# Patient Record
Sex: FEMALE | Race: WHITE | NOT HISPANIC OR LATINO | Employment: FULL TIME | URBAN - METROPOLITAN AREA
[De-identification: names, ages, dates, MRNs, and addresses within clinical notes are randomized per-mention and may not be internally consistent; named-entity substitution may affect disease eponyms.]

---

## 2017-08-25 ENCOUNTER — TRANSCRIBE ORDERS (OUTPATIENT)
Dept: PHYSICAL THERAPY | Facility: CLINIC | Age: 42
End: 2017-08-25

## 2017-08-25 DIAGNOSIS — M77.12 LATERAL EPICONDYLITIS OF LEFT ELBOW: Primary | ICD-10-CM

## 2017-08-30 ENCOUNTER — TREATMENT (OUTPATIENT)
Dept: PHYSICAL THERAPY | Facility: CLINIC | Age: 42
End: 2017-08-30

## 2017-08-30 DIAGNOSIS — M25.522 LEFT ELBOW PAIN: Primary | ICD-10-CM

## 2017-08-30 PROCEDURE — 97110 THERAPEUTIC EXERCISES: CPT | Performed by: PHYSICAL THERAPIST

## 2017-08-30 PROCEDURE — 97001 PR PHYS THERAPY EVALUATION: CPT | Performed by: PHYSICAL THERAPIST

## 2017-08-30 PROCEDURE — 97140 MANUAL THERAPY 1/> REGIONS: CPT | Performed by: PHYSICAL THERAPIST

## 2017-08-30 NOTE — PROGRESS NOTES
"Physical Therapy Initial Evaluation and Plan of Care        Subjective Evaluation    History of Present Illness  Mechanism of injury: Patient reports that she initially injuried elbow in  and had 10-11 physical therapy appointments that helped some but it was not until she had a \"shot\" that it gave her relief.  She was able to return to work however a few weeks ago she started to have an increase in symptoms.  She has been wearing the epicondylitis brace with some mild relief.  Has difficulty using left hand and arm including lifting, pushing, pulling, twisting, and reaching      Patient Occupation: supervisor Quality of life: good    Pain  Current pain ratin  Quality: throbbing, radiating, burning, sharp and tight  Relieving factors: medications  Aggravating factors: repetitive movement, outstretched reach and movement  Progression: no change    Hand dominance: right    Treatments  Previous treatment: physical therapy, medication and injection treatment  Current treatment: medication  Patient Goals  Patient goals for therapy: increased strength, independence with ADLs/IADLs, return to work, increased motion, decreased pain and decreased edema             Objective     Palpation   Left   Tenderness of the brachioradialis, pronator teres, wrist extensors and wrist flexors.     Active Range of Motion     Left Elbow   Flexion: 140 degrees   Extension: 30 degrees     Strength/Myotome Testing     Left Elbow   Flexion: 4+  Extension: 4+    Left Wrist/Hand      (2nd hand position)     Trial 1: 15    Right Wrist/Hand      (2nd hand position)     Trial 1: 40    Additional Strength Details  Shoulder ER 3+/5  Shoulder IR  4/5         Assessment & Plan     Assessment  Impairments: lacks appropriate home exercise program and pain with function  Assessment details: Patient is a 41 year old female who presents with increased left elbow pain.  Upon assessment she has limited AROM of left elbow extension. Tenderness " to palpation at lateral epicondyle and wrist extensors.  Limited  strength by greater than 50%.   Prognosis: good  Functional Limitations: carrying objects, lifting, pulling, pushing, uncomfortable because of pain and unable to perform repetitive tasks  Plan  Therapy options: will be seen for skilled physical therapy services  Planned modality interventions: TENS, ultrasound, thermotherapy (hydrocollator packs) and cryotherapy  Planned therapy interventions: manual therapy, soft tissue mobilization, strengthening, stretching, therapeutic activities, joint mobilization, home exercise program, functional ROM exercises, flexibility and body mechanics training  Frequency: 3x week  Duration in weeks: 6  Treatment plan discussed with: patient        Manual Therapy:    10     mins  00232;  Therapeutic Exercise:    10     mins  90739;       Ultrasound:     8     mins  74052;    Iontophoresis               5   mins 56466    Timed Treatment:   33   mins   Total Treatment:     50   mins    PT SIGNATURE: Radha Lagunas, PT   DATE TREATMENT INITIATED: 8/30/2017    Initial Certification  Certification Period: 11/28/2017  I certify that the therapy services are furnished while this patient is under my care.  The services outlined above are required by this patient, and will be reviewed every 90 days.     PHYSICIAN: Sean Arnold MD      DATE:     Please sign and return via fax to [phone].. Thank you, [facility].

## 2017-09-05 ENCOUNTER — TREATMENT (OUTPATIENT)
Dept: PHYSICAL THERAPY | Facility: CLINIC | Age: 42
End: 2017-09-05

## 2017-09-05 DIAGNOSIS — M25.522 LEFT ELBOW PAIN: Primary | ICD-10-CM

## 2017-09-05 PROCEDURE — 97110 THERAPEUTIC EXERCISES: CPT | Performed by: PHYSICAL THERAPIST

## 2017-09-05 PROCEDURE — 97035 APP MDLTY 1+ULTRASOUND EA 15: CPT | Performed by: PHYSICAL THERAPIST

## 2017-09-05 PROCEDURE — 97140 MANUAL THERAPY 1/> REGIONS: CPT | Performed by: PHYSICAL THERAPIST

## 2017-09-05 PROCEDURE — A4556 ELECTRODES, PAIR: HCPCS | Performed by: PHYSICAL THERAPIST

## 2017-09-05 NOTE — PROGRESS NOTES
Physical Therapy Daily Progress Note            Subjective   Elbow still hurts and can't straighten all the way.  The patch helped some    Objective   See Exercise, Manual, and Modality Logs for complete treatment.       Assessment/Plan    Continues with mod pain and tenderness.  ROM limited with pain but improved with mobs.    Continue per POC             Manual Therapy:    11     mins  80850;  Therapeutic Exercise:    12     mins  59755;     Neuromuscular Minor:    0    mins  32181;    Therapeutic Activity:     0     mins  37494;     Gait Trainin     mins  60018;     Ultrasound:     8     mins  33039;    Work Hardening           0      mins 10417  Iontophoresis               2   mins 18957    Timed Treatment:   33   mins   Total Treatment:     36   mins    Alicja Meier PT  Physical Therapist

## 2017-09-06 ENCOUNTER — TREATMENT (OUTPATIENT)
Dept: PHYSICAL THERAPY | Facility: CLINIC | Age: 42
End: 2017-09-06

## 2017-09-06 DIAGNOSIS — M25.522 LEFT ELBOW PAIN: Primary | ICD-10-CM

## 2017-09-06 PROCEDURE — 97140 MANUAL THERAPY 1/> REGIONS: CPT | Performed by: PHYSICAL THERAPIST

## 2017-09-06 PROCEDURE — 97110 THERAPEUTIC EXERCISES: CPT | Performed by: PHYSICAL THERAPIST

## 2017-09-06 PROCEDURE — 97035 APP MDLTY 1+ULTRASOUND EA 15: CPT | Performed by: PHYSICAL THERAPIST

## 2017-09-06 PROCEDURE — 97014 ELECTRIC STIMULATION THERAPY: CPT | Performed by: PHYSICAL THERAPIST

## 2017-09-06 NOTE — PROGRESS NOTES
Physical Therapy Daily Progress Note            Subjective Evaluation    History of Present Illness    Subjective comment: Patient reports that when she left here she was feeling really good but had a lot of soreness last night and today.         Objective   See Exercise, Manual, and Modality Logs for complete treatment.       Assessment & Plan     Assessment  Assessment details: Patient had full elbow extension following session however still had significant tightness and tenderness at lateral elbow.     Plan  Plan details: Progress as tolerated.                      Manual Therapy:    12     mins  61489;  Therapeutic Exercise:    15     mins  54305;        Ultrasound:     8     mins  30579;    Iontophoresis               5   mins 25413    Timed Treatment:   40   mins   Total Treatment:     55   mins    Radha Lagunas, PT  Physical Therapist

## 2017-09-08 ENCOUNTER — TREATMENT (OUTPATIENT)
Dept: PHYSICAL THERAPY | Facility: CLINIC | Age: 42
End: 2017-09-08

## 2017-09-08 DIAGNOSIS — M25.522 LEFT ELBOW PAIN: Primary | ICD-10-CM

## 2017-09-08 PROCEDURE — 97035 APP MDLTY 1+ULTRASOUND EA 15: CPT | Performed by: PHYSICAL THERAPIST

## 2017-09-08 PROCEDURE — 97014 ELECTRIC STIMULATION THERAPY: CPT | Performed by: PHYSICAL THERAPIST

## 2017-09-08 PROCEDURE — 97110 THERAPEUTIC EXERCISES: CPT | Performed by: PHYSICAL THERAPIST

## 2017-09-08 PROCEDURE — 97140 MANUAL THERAPY 1/> REGIONS: CPT | Performed by: PHYSICAL THERAPIST

## 2017-09-08 NOTE — PROGRESS NOTES
Physical Therapy Daily Progress Note            Subjective   Motion is better but very sore.  Knot has gone down but still there.    Objective   See Exercise, Manual, and Modality Logs for complete treatment.     Skin irritation from patch adhesive    Assessment/Plan    Responding favorably to Rx with increased mobility and dec mm tension but still with mod tenderness and pain      Progress per POC as tolerated           Manual Therapy:    12     mins  62236;  Therapeutic Exercise:    12     mins  20458;     Neuromuscular Minor:    0    mins  05219;    Therapeutic Activity:     0     mins  91822;     Gait Trainin     mins  40173;     Ultrasound:     8     mins  62498;    Work Hardening           0      mins 19982  Iontophoresis               0   mins 00649    Timed Treatment:   32   mins   Total Treatment:     52   mins    Alicja Meier PT  Physical Therapist

## 2017-09-11 ENCOUNTER — TREATMENT (OUTPATIENT)
Dept: PHYSICAL THERAPY | Facility: CLINIC | Age: 42
End: 2017-09-11

## 2017-09-11 DIAGNOSIS — M25.522 LEFT ELBOW PAIN: Primary | ICD-10-CM

## 2017-09-11 PROCEDURE — 97110 THERAPEUTIC EXERCISES: CPT | Performed by: PHYSICAL THERAPIST

## 2017-09-11 PROCEDURE — 97035 APP MDLTY 1+ULTRASOUND EA 15: CPT | Performed by: PHYSICAL THERAPIST

## 2017-09-11 PROCEDURE — 97140 MANUAL THERAPY 1/> REGIONS: CPT | Performed by: PHYSICAL THERAPIST

## 2017-09-11 NOTE — PROGRESS NOTES
Physical Therapy Daily Progress Note            Subjective   It's feeling a lot better    Objective   See Exercise, Manual, and Modality Logs for complete treatment.       Assessment/Plan    responding favorably to Rx with dec pain and mm tension and increasing mobility; tolerated initiation of strengthening              Manual Therapy:    12     mins  81165;  Therapeutic Exercise:    18     mins  42216;     Neuromuscular Minor:    0    mins  74798;    Therapeutic Activity:     0     mins  89557;     Gait Trainin     mins  51517;     Ultrasound:     0     mins  43157;    Work Hardening           0      mins 04268  Iontophoresis               0   mins 97251    Timed Treatment:   30   mins   Total Treatment:     49   mins    Alicja Meier, PRASHANTH  Physical Therapist

## 2017-09-12 ENCOUNTER — TREATMENT (OUTPATIENT)
Dept: PHYSICAL THERAPY | Facility: CLINIC | Age: 42
End: 2017-09-12

## 2017-09-12 DIAGNOSIS — M25.522 LEFT ELBOW PAIN: Primary | ICD-10-CM

## 2017-09-12 PROCEDURE — 97110 THERAPEUTIC EXERCISES: CPT | Performed by: PHYSICAL THERAPIST

## 2017-09-12 PROCEDURE — 97140 MANUAL THERAPY 1/> REGIONS: CPT | Performed by: PHYSICAL THERAPIST

## 2017-09-12 PROCEDURE — 97014 ELECTRIC STIMULATION THERAPY: CPT | Performed by: PHYSICAL THERAPIST

## 2017-09-12 NOTE — PROGRESS NOTES
Physical Therapy Daily Progress Note            Subjective Evaluation    History of Present Illness    Subjective comment: it is getting better but still does not have smooth flexion and extension       Objective   See Exercise, Manual, and Modality Logs for complete treatment.       Assessment & Plan     Assessment  Assessment details: Patient tolerated treatment fairly well.  Has full AROM however still has moderate tenderness at lateral joint line. Mild pronation tightness.     Plan  Plan details: Progress as tolerated.                      Manual Therapy:    10     mins  28046;  Therapeutic Exercise:    12     mins  14182;          Ultrasound:     8     mins  78696;        Timed Treatment:   30   mins   Total Treatment:     50   mins    Radha Lagunas, PT  Physical Therapist

## 2017-09-14 ENCOUNTER — TREATMENT (OUTPATIENT)
Dept: PHYSICAL THERAPY | Facility: CLINIC | Age: 42
End: 2017-09-14

## 2017-09-14 DIAGNOSIS — M25.522 LEFT ELBOW PAIN: Primary | ICD-10-CM

## 2017-09-14 PROCEDURE — 97140 MANUAL THERAPY 1/> REGIONS: CPT | Performed by: PHYSICAL THERAPIST

## 2017-09-14 PROCEDURE — 97014 ELECTRIC STIMULATION THERAPY: CPT | Performed by: PHYSICAL THERAPIST

## 2017-09-14 PROCEDURE — 97110 THERAPEUTIC EXERCISES: CPT | Performed by: PHYSICAL THERAPIST

## 2017-09-14 NOTE — PROGRESS NOTES
Physical Therapy Daily Progress Note            Subjective Evaluation    History of Present Illness    Subjective comment: Patient reports that she feels really sore after last treatment.  States that overall feel better but elbow still gets stiff       Objective   See Exercise, Manual, and Modality Logs for complete treatment.       Assessment & Plan     Assessment  Assessment details: Patient tolerated. Treatment fairly well.  Still has tenderness at pronator teres.  Full AROM of motion of left elbow.                      Manual Therapy:    8     mins  23818;  Therapeutic Exercise:    20     mins  95686;     Ultrasound:     8     mins  67573;      Timed Treatment:   36   mins   Total Treatment:     50   mins    Radha Lagunas, PT  Physical Therapist

## 2017-09-19 ENCOUNTER — TREATMENT (OUTPATIENT)
Dept: PHYSICAL THERAPY | Facility: CLINIC | Age: 42
End: 2017-09-19

## 2017-09-19 DIAGNOSIS — M25.522 LEFT ELBOW PAIN: Primary | ICD-10-CM

## 2017-09-19 PROCEDURE — 97110 THERAPEUTIC EXERCISES: CPT | Performed by: PHYSICAL THERAPIST

## 2017-09-19 PROCEDURE — 97140 MANUAL THERAPY 1/> REGIONS: CPT | Performed by: PHYSICAL THERAPIST

## 2017-09-19 NOTE — PROGRESS NOTES
Physical Therapy Daily Progress Note            Subjective Evaluation    History of Present Illness    Subjective comment: Patient reports that after therapy it feels better but it does start to tighten up between appointments.  She woke up last night with it hurting.        Objective   See Exercise, Manual, and Modality Logs for complete treatment.       Assessment & Plan     Assessment  Assessment details: Patient tolerated treatment fairly well.  Following treatment she has full P/AROM of left elbow although she still has tenderness at deep pronator.     Plan  Plan details: Progress as tolerated.                      Manual Therapy:    10     mins  41227;  Therapeutic Exercise:    30     mins  07882;     Ultrasound:     8     mins  47291;    Iontophoresis               2   mins 41696    Timed Treatment:   50   mins   Total Treatment:     50   mins    Radha Lagunas, PT  Physical Therapist

## 2017-09-21 ENCOUNTER — TREATMENT (OUTPATIENT)
Dept: PHYSICAL THERAPY | Facility: CLINIC | Age: 42
End: 2017-09-21

## 2017-09-21 DIAGNOSIS — M25.522 LEFT ELBOW PAIN: Primary | ICD-10-CM

## 2017-09-21 PROCEDURE — 97140 MANUAL THERAPY 1/> REGIONS: CPT | Performed by: PHYSICAL THERAPIST

## 2017-09-21 PROCEDURE — 97110 THERAPEUTIC EXERCISES: CPT | Performed by: PHYSICAL THERAPIST

## 2017-09-22 NOTE — PROGRESS NOTES
Re-Assessment / Re-Certification        Patient: Katie Mackey   : 1975  Diagnosis/ICD-10 Code:  Left elbow pain [M25.522]  Referring practitioner: Sean Arnold MD  Date of Initial Visit: 2017  Today's Date: 2017  Patient seen for 9 sessions      Subjective:   Katie Mackey reports: Patient reports that she has improved AROM of elbow but still tightens up on her a few hours after therapy.  Still has pain with certain movement; picking something up from the counter when arm is extended for example.     Clinical Progress: improved  Home Program Compliance: Yes  Treatment has included: therapeutic exercise, manual therapy, electrical stimulation, ultrasound, iontophoresis and cryotherapy    Subjective   Objective     Palpation   Left   Tenderness of the brachioradialis, pronator teres, wrist extensors and wrist flexors.     Active Range of Motion     Left Elbow   Flexion: 140 degrees   Extension: 0 degrees     Strength/Myotome Testing     Left Elbow   Flexion: 4+  Extension: 4+    Additional Strength Details  Shoulder ER 4/5  Shoulder IR  4/5     Assessment & Plan     Assessment  Assessment details: Patient has tolerated treatment fairly well.  She has full AROM of left elbow however still has tightness along brachioradialis and extensor carpi radialis longus and brevis.      Plan  Plan details: I believe Ms. Mackey would benefit from continued physical therapy.       Progress toward previous goals: Partially Met          Recommendations: Continue as planned  Timeframe: 1 month  Prognosis to achieve goals: good    PT Signature: Radha Lagunas, PT      Based upon review of the patient's progress and continued therapy plan, it is my medical opinion that Katie Mackey should continue physical therapy treatment at [facility]  [address] [place] [id number] [phone].    Signature: __________________________________  Sean Watson  MD Catalina    Manual Therapy:    12     mins  10708;  Therapeutic Exercise:    25     mins  32618;     Ultrasound:     8     mins  48547;      Timed Treatment:   45   mins   Total Treatment:     50   mins

## 2017-09-26 ENCOUNTER — TREATMENT (OUTPATIENT)
Dept: PHYSICAL THERAPY | Facility: CLINIC | Age: 42
End: 2017-09-26

## 2017-09-26 DIAGNOSIS — M25.522 LEFT ELBOW PAIN: Primary | ICD-10-CM

## 2017-09-26 PROCEDURE — 97110 THERAPEUTIC EXERCISES: CPT | Performed by: PHYSICAL THERAPIST

## 2017-09-26 PROCEDURE — 97140 MANUAL THERAPY 1/> REGIONS: CPT | Performed by: PHYSICAL THERAPIST

## 2017-09-26 NOTE — PROGRESS NOTES
Physical Therapy Daily Progress Note            Subjective Evaluation    History of Present Illness    Subjective comment: patient reports that she had several days relief out of the taping.         Objective   See Exercise, Manual, and Modality Logs for complete treatment.       Assessment & Plan     Assessment  Assessment details: Patient tolerated treatment well.  Had decreased muscle guarding through ulnar/radial joint.  Mild to moderate proximal joint restriction remains.     Plan  Plan details: Progress as tolerated.                      Manual Therapy:    5     mins  83687;  Therapeutic Exercise:    20     mins  44662;     Ultrasound:     8     mins  83985;      Timed Treatment:   33   mins   Total Treatment:     35   mins    Radha Lagunas, PT  Physical Therapist

## 2017-09-29 ENCOUNTER — TREATMENT (OUTPATIENT)
Dept: PHYSICAL THERAPY | Facility: CLINIC | Age: 42
End: 2017-09-29

## 2017-09-29 DIAGNOSIS — M25.522 LEFT ELBOW PAIN: Primary | ICD-10-CM

## 2017-09-29 PROCEDURE — 97110 THERAPEUTIC EXERCISES: CPT | Performed by: PHYSICAL THERAPIST

## 2017-09-29 PROCEDURE — 97140 MANUAL THERAPY 1/> REGIONS: CPT | Performed by: PHYSICAL THERAPIST

## 2017-09-29 NOTE — PROGRESS NOTES
Physical Therapy Daily Progress Note            Subjective Evaluation    History of Present Illness    Subjective comment: Patient reports that she continues to wake up with pain.  Today was worst than most       Objective   See Exercise, Manual, and Modality Logs for complete treatment.       Assessment & Plan     Assessment  Assessment details: Tenderness along lateral and medial epicondyle.  Mild supination restriction at end range.  Did not have as much relief with the kinesiotaping this time, tried proximal radial head taping for increased supination with leukotape.     Plan  Plan details: Progress as tolerated.                      Manual Therapy:    10     mins  76475;  Therapeutic Exercise:    30     mins  22662;   Ultrasound 8 minutes  Ionto 2 minutes          Timed Treatment:   50   mins   Total Treatment:     50   mins    Radha Lagunas, PT  Physical Therapist

## 2017-10-03 ENCOUNTER — TREATMENT (OUTPATIENT)
Dept: PHYSICAL THERAPY | Facility: CLINIC | Age: 42
End: 2017-10-03

## 2017-10-03 DIAGNOSIS — M25.522 LEFT ELBOW PAIN: Primary | ICD-10-CM

## 2017-10-03 PROCEDURE — 97140 MANUAL THERAPY 1/> REGIONS: CPT | Performed by: PHYSICAL THERAPIST

## 2017-10-03 PROCEDURE — 97110 THERAPEUTIC EXERCISES: CPT | Performed by: PHYSICAL THERAPIST

## 2017-10-03 NOTE — PROGRESS NOTES
Physical Therapy Daily Progress Note            Subjective Evaluation    History of Present Illness    Subjective comment: Patient reports that she continues to wake up with        Objective   See Exercise, Manual, and Modality Logs for complete treatment.       Assessment & Plan     Assessment  Assessment details: Patient tolerated treatment well.  She continues to have pain through lateral epicondyle and pronator teres.        Plan  Plan details: Progress as tolerated.                      Manual Therapy:    12     mins  33815;  Therapeutic Exercise:    30     mins  32906;     Ultrasound:     8     mins  65556;      Timed Treatment:   50   mins   Total Treatment:     50   mins    Radha Lagunas, PT  Physical Therapist

## 2017-10-05 ENCOUNTER — TREATMENT (OUTPATIENT)
Dept: PHYSICAL THERAPY | Facility: CLINIC | Age: 42
End: 2017-10-05

## 2017-10-05 DIAGNOSIS — M25.522 LEFT ELBOW PAIN: Primary | ICD-10-CM

## 2017-10-05 PROCEDURE — 97035 APP MDLTY 1+ULTRASOUND EA 15: CPT | Performed by: PHYSICAL THERAPIST

## 2017-10-05 PROCEDURE — 97140 MANUAL THERAPY 1/> REGIONS: CPT | Performed by: PHYSICAL THERAPIST

## 2017-10-05 PROCEDURE — 97110 THERAPEUTIC EXERCISES: CPT | Performed by: PHYSICAL THERAPIST

## 2017-10-05 PROCEDURE — A4556 ELECTRODES, PAIR: HCPCS | Performed by: PHYSICAL THERAPIST

## 2017-10-05 NOTE — PROGRESS NOTES
Physical Therapy Daily Progress Note            Subjective   Hurting bad since last visit possibly from new stretch with stabbing pain (in tears)    Objective   See Exercise, Manual, and Modality Logs for complete treatment.     In obvious pain with visible tears and expression of frustration    Assessment/Plan  Notable flare-up past few days with intermittent sharp and intense pain localized at epicondyle.  Tolerated gentle manual treatment with increased mobility after               Manual Therapy:    9     mins  31049;  Therapeutic Exercise:    26     mins  65382;     Neuromuscular Minor:    0    mins  13080;    Therapeutic Activity:     0     mins  08215;     Gait Trainin     mins  91052;     Ultrasound:     8     mins  55764;    Work Hardening           0      mins 14677  Iontophoresis               2   mins 01712    Timed Treatment:   45   mins   Total Treatment:     50   mins    Alicja Meier PT  Physical Therapist

## 2017-10-10 ENCOUNTER — TREATMENT (OUTPATIENT)
Dept: PHYSICAL THERAPY | Facility: CLINIC | Age: 42
End: 2017-10-10

## 2017-10-10 DIAGNOSIS — M25.522 LEFT ELBOW PAIN: Primary | ICD-10-CM

## 2017-10-10 PROCEDURE — 97035 APP MDLTY 1+ULTRASOUND EA 15: CPT | Performed by: PHYSICAL THERAPIST

## 2017-10-10 PROCEDURE — 97110 THERAPEUTIC EXERCISES: CPT | Performed by: PHYSICAL THERAPIST

## 2017-10-10 PROCEDURE — 97140 MANUAL THERAPY 1/> REGIONS: CPT | Performed by: PHYSICAL THERAPIST

## 2017-10-10 NOTE — PROGRESS NOTES
Physical Therapy Daily Progress Note            Subjective Evaluation    History of Present Illness    Subjective comment: Patient reports that she went and had a massage over the weekend and had some relief.  STill has considerable pain in the mornings and it takes several hours to loosen it up.        Objective   See Exercise, Manual, and Modality Logs for complete treatment.       Assessment & Plan     Assessment  Assessment details: Patient tolerated treatment fair.  She continues to have palpable pain at lateral epicondyle.  Pain with resisted supination and wrist extension.     Plan  Plan details: Progress as tolerated.                      Manual Therapy:    10     mins  09267;  Therapeutic Exercise:    20     mins  12382;        Ultrasound:     8     mins  83676;          Timed Treatment:   38   mins   Total Treatment:     40   mins    Radha Lagunas, PT  Physical Therapist

## 2017-10-12 ENCOUNTER — TREATMENT (OUTPATIENT)
Dept: PHYSICAL THERAPY | Facility: CLINIC | Age: 42
End: 2017-10-12

## 2017-10-12 DIAGNOSIS — M25.522 LEFT ELBOW PAIN: Primary | ICD-10-CM

## 2017-10-12 PROCEDURE — 97140 MANUAL THERAPY 1/> REGIONS: CPT | Performed by: PHYSICAL THERAPIST

## 2017-10-12 PROCEDURE — 97110 THERAPEUTIC EXERCISES: CPT | Performed by: PHYSICAL THERAPIST

## 2017-10-12 NOTE — PROGRESS NOTES
Physical Therapy Daily Progress Note            Subjective Evaluation    History of Present Illness    Subjective comment: patient reports that she went and had a massage which helped some but still waking up with pain that takes 2-3 hours to diminish.  Still unable to sometimes hold a cup or open a door.        Objective   See Exercise, Manual, and Modality Logs for complete treatment.       Assessment & Plan     Assessment  Assessment details: Patient had improved supination and extension today to WNL however continues to have tenderness at epicondyle and wrist extensors.  Patient continues to express frustration due to lack of progress.     Plan  Plan details: Progress as tolerated.                      Manual Therapy:    10     mins  19309;  Therapeutic Exercise:    15     mins  84347;         Ultrasound:     8     mins  06021;      Iontophoresis               2   mins 83426    Timed Treatment:   35   mins   Total Treatment:     40   mins    Radha Lagunas, PT  Physical Therapist

## 2017-10-17 ENCOUNTER — TREATMENT (OUTPATIENT)
Dept: PHYSICAL THERAPY | Facility: CLINIC | Age: 42
End: 2017-10-17

## 2017-10-17 DIAGNOSIS — M25.522 LEFT ELBOW PAIN: Primary | ICD-10-CM

## 2017-10-17 PROCEDURE — 97140 MANUAL THERAPY 1/> REGIONS: CPT | Performed by: PHYSICAL THERAPIST

## 2017-10-17 PROCEDURE — 97110 THERAPEUTIC EXERCISES: CPT | Performed by: PHYSICAL THERAPIST

## 2017-10-17 PROCEDURE — 97035 APP MDLTY 1+ULTRASOUND EA 15: CPT | Performed by: PHYSICAL THERAPIST

## 2017-10-17 NOTE — PROGRESS NOTES
Physical Therapy Daily Progress Note            Subjective Evaluation    History of Present Illness    Subjective comment: Patient reports that her elbow is about the same.  ATtempted to call MD but has not heard back.        Objective   See Exercise, Manual, and Modality Logs for complete treatment.       Assessment & Plan     Assessment  Assessment details: Patient has tolerated treatment fair.  She continue to have increased tenderness at wrist extensor tendons and lateral epicondyle.  Full elbow ROM following manual therapy.  STill has pain with low level strengthening.     Plan  Plan details: Progress as tolerated.                      Manual Therapy:    10     mins  04977;  Therapeutic Exercise:    20     mins  15283;       Ultrasound:     8     mins  85870;      Timed Treatment:   38   mins   Total Treatment:     38   mins    Radha Lagunas, PT  Physical Therapist

## 2017-10-19 ENCOUNTER — TREATMENT (OUTPATIENT)
Dept: PHYSICAL THERAPY | Facility: CLINIC | Age: 42
End: 2017-10-19

## 2017-10-19 DIAGNOSIS — M25.522 LEFT ELBOW PAIN: Primary | ICD-10-CM

## 2017-10-19 PROCEDURE — 97140 MANUAL THERAPY 1/> REGIONS: CPT | Performed by: PHYSICAL THERAPIST

## 2017-10-19 PROCEDURE — 97110 THERAPEUTIC EXERCISES: CPT | Performed by: PHYSICAL THERAPIST

## 2017-10-19 PROCEDURE — 97035 APP MDLTY 1+ULTRASOUND EA 15: CPT | Performed by: PHYSICAL THERAPIST

## 2017-10-19 NOTE — PROGRESS NOTES
Physical Therapy Daily Progress Note            Subjective Evaluation    History of Present Illness    Subjective comment: Patient reports that she does notice a difference when we do not use the patch.  She was sore after the last session but today was better.        Objective   See Exercise, Manual, and Modality Logs for complete treatment.       Assessment & Plan     Assessment  Assessment details: Patient tolerated treatment well.  Still has tightness through wrist extensor and lateral epicondyle.  Full AROM of left elbow although has strength limitations in both flexion and extension secondary to pain.     Plan  Plan details: Progress as tolerated.                      Manual Therapy:    15     mins  17236;  Therapeutic Exercise:    20     mins  48068;     Ultrasound:     8     mins  76405;    Iontophoresis               2   mins 48348    Timed Treatment:   45   mins   Total Treatment:     50   mins    Radha Lagunas, PT  Physical Therapist

## 2017-10-24 ENCOUNTER — TREATMENT (OUTPATIENT)
Dept: PHYSICAL THERAPY | Facility: CLINIC | Age: 42
End: 2017-10-24

## 2017-10-24 DIAGNOSIS — M25.522 LEFT ELBOW PAIN: Primary | ICD-10-CM

## 2017-10-24 PROCEDURE — 97140 MANUAL THERAPY 1/> REGIONS: CPT | Performed by: PHYSICAL THERAPIST

## 2017-10-24 PROCEDURE — 97110 THERAPEUTIC EXERCISES: CPT | Performed by: PHYSICAL THERAPIST

## 2017-10-24 PROCEDURE — 97035 APP MDLTY 1+ULTRASOUND EA 15: CPT | Performed by: PHYSICAL THERAPIST

## 2017-10-24 NOTE — PROGRESS NOTES
Physical Therapy Daily Progress Note            Subjective Evaluation    History of Present Illness    Subjective comment: Patient reports that she continues to have good and bad days however the last two days have been painful.        Objective   See Exercise, Manual, and Modality Logs for complete treatment.       Assessment & Plan     Assessment  Assessment details: Patient continues to have increased pain at lateral epicondyle.  Increased pain with wrist extension and supination. Full AROM of left elbow.     Plan  Plan details: Reassess next appointment                     Manual Therapy:    15     mins  09506;  Therapeutic Exercise:    15     mins  26518;       Ultrasound:     8     mins  48090;    Iontophoresis               2   mins 49092    Timed Treatment:   40   mins   Total Treatment:     40   mins    Radha Lagunas, PT  Physical Therapist

## 2017-10-26 ENCOUNTER — TREATMENT (OUTPATIENT)
Dept: PHYSICAL THERAPY | Facility: CLINIC | Age: 42
End: 2017-10-26

## 2017-10-26 DIAGNOSIS — M25.522 LEFT ELBOW PAIN: Primary | ICD-10-CM

## 2017-10-26 PROCEDURE — 97110 THERAPEUTIC EXERCISES: CPT | Performed by: PHYSICAL THERAPIST

## 2017-10-26 PROCEDURE — 97140 MANUAL THERAPY 1/> REGIONS: CPT | Performed by: PHYSICAL THERAPIST

## 2017-10-26 PROCEDURE — 97035 APP MDLTY 1+ULTRASOUND EA 15: CPT | Performed by: PHYSICAL THERAPIST

## 2017-10-26 NOTE — PROGRESS NOTES
"Re-Assessment / Re-Certification        Patient: Katie Mackey   : 1975  Diagnosis/ICD-10 Code:  Left elbow pain [M25.522]  Referring practitioner: Sean Arnold MD  Date of Initial Visit: 10/26/2017  Today's Date: 10/26/2017  Patient seen for 19 sessions      Subjective:       Home Program Compliance: Yes  Treatment has included: therapeutic exercise, manual therapy, electrical stimulation, ultrasound and iontophoresis    Subjective Evaluation    History of Present Illness  Mechanism of injury: Patient reports that she feels about 25% better.  Still having a hard time lifting anything more than an empty coffee cup, fixing hair, reaching, and occasionally waking her up.  Patient has had consistent complaints of waking up with \"tighntess\" and pain in elbow which typically takes several hours to resolve and have AROM.          Objective       Palpation   Left   Tenderness of the brachioradialis, pronator teres, wrist extensors and wrist flexors.     Active Range of Motion     Left Elbow   Flexion: 140 degrees   Extension: 0 degrees     Strength/Myotome Testing     Left Elbow   Flexion: 4+  Extension: 4+    Left Wrist/Hand   Wrist extension: 3+ (pain)     (2nd hand position)     Trial 1: 15 lbs    Right Wrist/Hand      (2nd hand position)     Trial 1: 50 lbs    Additional Strength Details  Shoulder ER 4/5 *pain  Shoulder IR  4/5      Assessment & Plan     Assessment  Assessment details: Patient has tolerated treatment fair. Overall she has improved AROM of left elbow to WNL but still complains that she wakes up in a fair amount of pain and tightness which takes several hours to loosen up.  He has localized tenderness at proximal wrist extensor tendon. Painful MMT specifically in wrist extension, wrist supination and Shoulder ER.     strength remains the same at 15# as at initial evaluation.       Plan  Plan details: Please advise.       Progress toward previous goals: Partially Met      PT " Signature: Radha Lagunas, PT        Manual Therapy:    15     mins  12099;  Therapeutic Exercise:    13     mins  88368;     Ultrasound:     8     mins  25458;      Iontophoresis               2   mins 43457    Timed Treatment:   38   mins   Total Treatment:     40   mins

## 2017-10-31 ENCOUNTER — TRANSCRIBE ORDERS (OUTPATIENT)
Dept: ADMINISTRATIVE | Facility: HOSPITAL | Age: 42
End: 2017-10-31

## 2017-10-31 DIAGNOSIS — M77.12 LEFT LATERAL EPICONDYLITIS: Primary | ICD-10-CM

## 2017-11-06 ENCOUNTER — TREATMENT (OUTPATIENT)
Dept: PHYSICAL THERAPY | Facility: CLINIC | Age: 42
End: 2017-11-06

## 2017-11-06 DIAGNOSIS — M25.522 LEFT ELBOW PAIN: Primary | ICD-10-CM

## 2017-11-06 PROCEDURE — 97035 APP MDLTY 1+ULTRASOUND EA 15: CPT | Performed by: PHYSICAL THERAPIST

## 2017-11-06 PROCEDURE — 97110 THERAPEUTIC EXERCISES: CPT | Performed by: PHYSICAL THERAPIST

## 2017-11-06 NOTE — PROGRESS NOTES
Physical Therapy Daily Progress Note            Subjective Evaluation    History of Present Illness    Subjective comment: Patient reports that she is about the same.  Has had a few good days but still has pain in the mornign which takes several hours to get going.        Objective   See Exercise, Manual, and Modality Logs for complete treatment.       Assessment & Plan     Assessment  Assessment details: Patient tolerated treatment fair.  She has full ARom of left elbow but had increased pain with bicep curl with 3#. Increased pain when attempting to perform ER with tband    Plan  Plan details: Progress as tolerated .                     Manual Therapy:    10     mins  81806;  Therapeutic Exercise:    25     mins  51072;     Ultrasound:     8     mins  55740;    Iontophoresis               2   mins 90201    Timed Treatment:   45   mins   Total Treatment:     45   mins    Radha Lagunas, PT  Physical Therapist

## 2017-11-10 ENCOUNTER — TREATMENT (OUTPATIENT)
Dept: PHYSICAL THERAPY | Facility: CLINIC | Age: 42
End: 2017-11-10

## 2017-11-10 DIAGNOSIS — M25.522 LEFT ELBOW PAIN: Primary | ICD-10-CM

## 2017-11-10 PROCEDURE — 97140 MANUAL THERAPY 1/> REGIONS: CPT | Performed by: PHYSICAL THERAPIST

## 2017-11-10 PROCEDURE — 97110 THERAPEUTIC EXERCISES: CPT | Performed by: PHYSICAL THERAPIST

## 2017-11-10 NOTE — PROGRESS NOTES
Physical Therapy Daily Progress Note            Subjective Evaluation    History of Present Illness    Subjective comment: Patient reports that she is feeling a little better during the day but still has significant pain in the mornings.        Objective   See Exercise, Manual, and Modality Logs for complete treatment.       Assessment & Plan     Assessment  Assessment details: Patient tolerated treatment well with an increase in strengthening activities. Still has tenderness along wrist extensors.     Plan  Plan details: Progress as tolerated.                      Manual Therapy:    8     mins  43559;  Therapeutic Exercise:    20     mins  27954;       Ultrasound:     8     mins  98344;        Timed Treatment:   32   mins   Total Treatment:     35   mins    Radha Lagunas, PT  Physical Therapist

## 2017-11-14 ENCOUNTER — HOSPITAL ENCOUNTER (OUTPATIENT)
Dept: MRI IMAGING | Facility: HOSPITAL | Age: 42
Discharge: HOME | End: 2017-11-14
Attending: PLASTIC SURGERY | Admitting: PLASTIC SURGERY

## 2017-11-14 DIAGNOSIS — M77.12 LEFT LATERAL EPICONDYLITIS: ICD-10-CM

## 2017-11-14 PROCEDURE — 73221 MRI JOINT UPR EXTREM W/O DYE: CPT

## 2017-11-16 ENCOUNTER — TREATMENT (OUTPATIENT)
Dept: PHYSICAL THERAPY | Facility: CLINIC | Age: 42
End: 2017-11-16

## 2017-11-16 DIAGNOSIS — M25.522 LEFT ELBOW PAIN: Primary | ICD-10-CM

## 2017-11-16 PROCEDURE — 97140 MANUAL THERAPY 1/> REGIONS: CPT | Performed by: PHYSICAL THERAPIST

## 2017-11-16 PROCEDURE — 97110 THERAPEUTIC EXERCISES: CPT | Performed by: PHYSICAL THERAPIST

## 2017-11-16 NOTE — PROGRESS NOTES
Physical Therapy Daily Progress Note            Subjective Evaluation    History of Present Illness    Subjective comment: Patient reports that it is about the same. Had MRI on Tuesday.        Objective   See Exercise, Manual, and Modality Logs for complete treatment.       Assessment & Plan     Assessment  Assessment details: Patient continues to have tenderness at medial condyle and extensor tendon.  Had increased difficulty with arm with strengthening activities today.     Plan  Plan details: Progress as tolerated.                      Manual Therapy:    15     mins  49376;  Therapeutic Exercise:    15     mins  87633;     Iontophoresis               2   mins 82537    Timed Treatment:   32   mins   Total Treatment:     35   mins    Radha Lagunas, PT  Physical Therapist

## 2017-11-17 ENCOUNTER — TREATMENT (OUTPATIENT)
Dept: PHYSICAL THERAPY | Facility: CLINIC | Age: 42
End: 2017-11-17

## 2017-11-17 DIAGNOSIS — M25.522 LEFT ELBOW PAIN: Primary | ICD-10-CM

## 2017-11-17 PROCEDURE — 97110 THERAPEUTIC EXERCISES: CPT | Performed by: PHYSICAL THERAPIST

## 2017-11-17 PROCEDURE — 97140 MANUAL THERAPY 1/> REGIONS: CPT | Performed by: PHYSICAL THERAPIST

## 2017-11-19 NOTE — PROGRESS NOTES
Physical Therapy Daily Progress Note            Subjective Evaluation    History of Present Illness    Subjective comment: About the same.        Objective   See Exercise, Manual, and Modality Logs for complete treatment.       Assessment & Plan     Assessment  Assessment details: Patient tolerated treatment fairly well.  Had full AROM following manual therapy.  Overall able to tolerated slightly more weight with strengthening 3# bicep curl and wrist flexion /extension however continues to have clicking/pain after 15-20 repetitions. Pain at medial epicondyle    Plan  Plan details: Progress as tolerated.                      Manual Therapy:    10     mins  83940;  Therapeutic Exercise:    15     mins  75062;       Iontophoresis               2   mins 14871    Timed Treatment:   27   mins   Total Treatment:     27   mins    Radha Lagunas, PT  Physical Therapist

## 2017-11-20 ENCOUNTER — TREATMENT (OUTPATIENT)
Dept: PHYSICAL THERAPY | Facility: CLINIC | Age: 42
End: 2017-11-20

## 2017-11-20 DIAGNOSIS — M25.522 LEFT ELBOW PAIN: Primary | ICD-10-CM

## 2017-11-20 PROCEDURE — 97035 APP MDLTY 1+ULTRASOUND EA 15: CPT | Performed by: PHYSICAL THERAPIST

## 2017-11-20 PROCEDURE — 97110 THERAPEUTIC EXERCISES: CPT | Performed by: PHYSICAL THERAPIST

## 2017-11-20 PROCEDURE — 97140 MANUAL THERAPY 1/> REGIONS: CPT | Performed by: PHYSICAL THERAPIST

## 2017-11-20 NOTE — PROGRESS NOTES
Physical Therapy Daily Progress Note            Subjective   It's getting better but still pain/stiffness when I wake up and reaching still a problem.  Up and down.    Objective   See Exercise, Manual, and Modality Logs for complete treatment.       Assessment/Plan    Continues with tenderness and local edema lat epic but improved mobility with dec pain after gentle manual Rx    Continue per POC as kaleigh             Manual Therapy:    9     mins  22939;  Therapeutic Exercise:    14     mins  24766;     Neuromuscular Minor:    0    mins  17650;    Therapeutic Activity:     0     mins  47958;     Gait Trainin     mins  56941;     Ultrasound:     8     mins  35720;    Work Hardening           0      mins 53206  Iontophoresis               0   mins 43260    Timed Treatment:   31   mins   Total Treatment:     33   mins    Alicja Meier, PT  Physical Therapist

## 2017-11-21 ENCOUNTER — TREATMENT (OUTPATIENT)
Dept: PHYSICAL THERAPY | Facility: CLINIC | Age: 42
End: 2017-11-21

## 2017-11-21 DIAGNOSIS — M25.522 LEFT ELBOW PAIN: Primary | ICD-10-CM

## 2017-11-21 PROCEDURE — 97110 THERAPEUTIC EXERCISES: CPT | Performed by: PHYSICAL THERAPIST

## 2017-11-21 PROCEDURE — 97140 MANUAL THERAPY 1/> REGIONS: CPT | Performed by: PHYSICAL THERAPIST

## 2017-11-29 ENCOUNTER — TREATMENT (OUTPATIENT)
Dept: PHYSICAL THERAPY | Facility: CLINIC | Age: 42
End: 2017-11-29

## 2017-11-29 DIAGNOSIS — M25.522 LEFT ELBOW PAIN: Primary | ICD-10-CM

## 2017-11-29 PROCEDURE — 97140 MANUAL THERAPY 1/> REGIONS: CPT | Performed by: PHYSICAL THERAPIST

## 2017-11-29 PROCEDURE — 97110 THERAPEUTIC EXERCISES: CPT | Performed by: PHYSICAL THERAPIST

## 2017-11-29 NOTE — PROGRESS NOTES
Re-Assessment / Re-Certification        Patient: Katie Mackey   : 1975  Diagnosis/ICD-10 Code:  Left elbow pain [M25.522]  Referring practitioner: Sean Arnold MD  Date of Initial Visit: 2017  Today's Date: 2017  Patient seen for 26 sessions      Subjective:   Katie Mackey reports: Patient reports that she is still having significant pain first thing in the morning which takes several hours to resolve.  She also reports that she still has pain when having to pick things up specifically with use of wrist extension.     Clinical Progress: unchanged  Home Program Compliance: Yes  Treatment has included: therapeutic exercise, manual therapy and iontophoresis    Subjective   Objective       Palpation   Left   Tenderness of the brachioradialis, pronator teres and wrist extensors.     Active Range of Motion     Left Elbow   Flexion: 140 degrees   Extension: 0 degrees     Strength/Myotome Testing     Left Elbow   Flexion: 4+  Extension: 4+    Left Wrist/Hand   Wrist extension: 3+ (pain)     (2nd hand position)     Trial 1: 20 lbs    Right Wrist/Hand      (2nd hand position)     Trial 1: 55 lbs    Additional Strength Details  Shoulder ER 4/5 *pain  Shoulder IR  4/5      Assessment & Plan     Assessment  Assessment details: Patient progress has plateaued over the last few weeks.  She continues to have full AROM/PROm of left elbow. She has consistent tenderness over proximal wrist extensors and bicep brachioradialis and pronator teres.  Her functional strength has improved slightly however still has pain with resisted wrist extension.   strength is significant diminished as compared to contralateral.     Plan  Plan details: Please advise      Progress toward previous goals: Partially Met      PT Signature: Rahda Lagunas, PT      Based upon review of the patient's progress and continued therapy plan, it is my medical opinion that Katie Mackey should continue physical therapy treatment  at [facility]  [address] [place] [id number] [phone].    Signature: __________________________________  Sean Arnold MD    Manual Therapy:    12     mins  25139;  Therapeutic Exercise:    20     mins  83285;       Timed Treatment:   32   mins   Total Treatment:     35   mins

## 2018-02-05 ENCOUNTER — DOCUMENTATION (OUTPATIENT)
Dept: PHYSICAL THERAPY | Facility: CLINIC | Age: 43
End: 2018-02-05

## 2018-02-06 NOTE — PROGRESS NOTES
Discharge Summary  Discharge Summary from Physical Therapy Report      Dates  PT visit: 08/30/2017-11/29/2017  Number of Visits: 24    Discharge Status of Patient: see note dated 111/29/2017    Goals: Partially Met    Discharge Plan: Patient to return to referring/providing physician      Date of Discharge 02/05/2018        Radha Lagunas, PT  Physical Therapist